# Patient Record
Sex: FEMALE | Employment: UNEMPLOYED | URBAN - METROPOLITAN AREA
[De-identification: names, ages, dates, MRNs, and addresses within clinical notes are randomized per-mention and may not be internally consistent; named-entity substitution may affect disease eponyms.]

---

## 2021-12-24 ENCOUNTER — HOSPITAL ENCOUNTER (EMERGENCY)
Facility: CLINIC | Age: 21
Discharge: HOME | End: 2021-12-24
Attending: EMERGENCY MEDICINE | Admitting: EMERGENCY MEDICINE

## 2021-12-24 ENCOUNTER — APPOINTMENT (OUTPATIENT)
Dept: GENERAL RADIOLOGY | Facility: CLINIC | Age: 21
End: 2021-12-24
Attending: EMERGENCY MEDICINE

## 2021-12-24 VITALS
RESPIRATION RATE: 18 BRPM | DIASTOLIC BLOOD PRESSURE: 69 MMHG | HEART RATE: 74 BPM | OXYGEN SATURATION: 98 % | TEMPERATURE: 98.9 F | SYSTOLIC BLOOD PRESSURE: 97 MMHG

## 2021-12-24 DIAGNOSIS — S91.214A LACERATION OF LESSER TOE OF RIGHT FOOT WITHOUT FOREIGN BODY WITH DAMAGE TO NAIL, INITIAL ENCOUNTER: ICD-10-CM

## 2021-12-24 PROCEDURE — 73660 X-RAY EXAM OF TOE(S): CPT | Mod: RT

## 2021-12-24 PROCEDURE — 99283 EMERGENCY DEPT VISIT LOW MDM: CPT | Mod: 25

## 2021-12-24 PROCEDURE — 11730 AVULSION NAIL PLATE SIMPLE 1: CPT | Mod: RT

## 2021-12-25 NOTE — ED PROVIDER NOTES
History     Chief Complaint:  Laceration       HPI   Iona Hoffman is a 21 year old female who presents with concern of injury to her right little toe.  The patient states she stubbed her toe and suffered injury to the nail with laceration.  No other injury.  She has pain when ambulating.  She has not tried any medication for pain yet.  No other concerns.    ROS:  Review of Systems  All systems otherwise negative or per HPI    Allergies:  No Known Allergies     Medications:    No current outpatient medications on file.      Past Medical History:    No past medical history on file.  There is no problem list on file for this patient.       Past Surgical History:    No past surgical history on file.     Family History:    family history is not on file.    Social History:     PCP: No Ref-Primary, Physician     Physical Exam   Patient Vitals for the past 24 hrs:   BP Temp Temp src Pulse Resp SpO2   12/24/21 1825 97/69 98.9  F (37.2  C) Temporal 74 18 98 %      Physical Exam  SKIN: Right little toe is lacerated and the nail is unseated from the nailbed.  HEMATOLOGIC/IMMUNOLOGIC/LYMPHATIC:  No pallor of the injured toe.  MUSCULOSKELETAL: Little toe tender to palpation but not grossly deformed.  NEUROLOGIC:  Alert, conversant, GCS 15.  PSYCHIATRIC:  Normal mood.    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 No evidence of fracture.         Report per radiology    Procedures   Laceration repair:    Location: Right little toe    Procedure: The toe was anesthetized with a digital block utilizing 6 cc of plain 1% lidocaine.  After anesthesia was achieved the ED technician cleanse the wound.  On inspection of the wound at this point the nail was clearly unseated from the nailbed so this was dissected away.  The surrounding skin was avulsed as opposed to lacerated.  This was trimmed away for a clean wound base.  The patient  did not require sutures.  Procedure was well-tolerated without complication.    Emergency Department Course:     Reviewed:  I reviewed nursing notes, vitals and past medical history    Assessments:   I obtained history and examined the patient as noted above.    I rechecked the patient and explained findings.    Interventions:  Medications - No data to display     Disposition:  The patient was discharged to home.     Impression & Plan    CMS Diagnoses: None    Medical Decision Making:  This patient suffered injury to the right little toe.  Considered fracture, see imaging.  On exam it was clear that the nail was injured and unseated from the nailbed.  Underwent the above procedure to remove the nail entirely which was only adherent to the proximal skin.  The surrounding skin otherwise was avulsed so she did not require laceration repair.  This was all well-tolerated.  I counseled the patient her nail will eventually grow in over the next month or so.  Otherwise advised to return if any wound concerns.    Diagnosis:    ICD-10-CM    1. Laceration of lesser toe of right foot without foreign body with damage to nail, initial encounter  S91.214A         Discharge Medications:  There are no discharge medications for this patient.       12/24/2021   No att. providers found        Florentin Nair MD  12/24/21 [id number]